# Patient Record
(demographics unavailable — no encounter records)

---

## 2025-04-22 NOTE — HISTORY OF PRESENT ILLNESS
[FreeTextEntry1] : Patient presents today for reevaluation of low back pain.  Patient reports was initially evaluated in June 2024 for complaints of low back pain with with intermittent radicular symptoms involving right lower extremity.  Patient reports she completed a course of physical therapy at that time as well as taking Mobic and Skelaxin unfortunately no significant improvement was noted.  Patient reports approximately the past 2 months she has been experiencing increasing low back pain with radicular symptoms involving right lower extremity.  Patient reports that she is currently not working due to her low back pain (patient is a ) presents today for reevaluation

## 2025-04-22 NOTE — PHYSICAL EXAM
[FreeTextEntry1] : EXAMINATION OF LUMBAR SPINE & LOWER EXTREMITIES:  Reflexes (R) L/E:                   Quadriceps 2                    Achilles 2 Reflexes (L) L/E:                    Quadriceps 2                    Achilles 2   Sensory L/E: Decreased right L4-L5     Good peripheral Pulses Bilateral L/E     Testing: Patricks (R) [- ]               Patricks (L) [- ]               Babinski [ down going bilaterally]               Chaddock [- bilaterally]               [place] [ -bilaterally]               Gonda [- bilaterally]               Clonus -[ ankle bilaterally]               Leseagues (R) [ -]               Leseagues (L) [- ]               Kemps [- ] SI Jt Lig.Challenege Test (R) +  SI Jt Lig.Challenege Test (L) -  S.L.R. ( R ) +40 degrees  S.L.R. ( L ) +50 degrees  Range of motion testing was performed with the use of a goniometer.                           Flexion: 50 degrees (normal - 75-90)                           Extension:10 degrees (normal - 30)                           Lateral Bending ( R ): 25 degrees  (normal - 35)                           Lateral Bending ( L ):35  degrees (normal - 35)                           Thoracic Rotation ( R ):30 degrees (normal - 35)                           Thoracic Rotation ( L ): 35 degrees (normal - 35) MMT: intact  Palpation of the Lumbar Spine: Tenderness and spasm involving the L4/L5 L5/S1 interspace. Tenderness involving the right upper sacroiliac joint. Tenderness and spasm the right lower lumbar paraspinal musculature. Trigger points involving the right gluteal musculature.

## 2025-04-22 NOTE — ASSESSMENT
[FreeTextEntry1] : MRI lumbar spine  Recheck after MRI for appropriate treatment plan.  Moist heat for muscle relaxation   Advil as needed for pain Home exercise plan reviewed with patient. Stressed the importance for the need for frequent change in position from sit to stand and stand to sit, as well as use of weight shifting techniques to alleviate low back discomfort. Instruction in proper posturing, body mechanics and lifting techniques.  Recheck in 3 weeks

## 2025-05-14 NOTE — ASSESSMENT
[FreeTextEntry1] : Patient reports that she has exhausted conservative management in terms of physical therapy, chiropractic care, acupuncture, TPI therapy, none of which have provided any significant relief. Patient declines neurosurgical consultation Pain management consult  evaluate for lumbar BOY/MMB lumbar facets  Moist heat for muscle relaxation   Advil as needed for pain Home exercise plan reviewed with patient. Stressed the importance for the need for frequent change in position from sit to stand and stand to sit, as well as use of weight shifting techniques to alleviate low back discomfort. Instruction in proper posturing, body mechanics and lifting techniques.  Recheck in 6 weeks

## 2025-05-14 NOTE — HISTORY OF PRESENT ILLNESS
[FreeTextEntry1] : Patient continues to complain of low back pain primarily right-sided with intermittent radicular symptoms involving her right lower extremity.  MRI of the lumbar spine was reviewed with the patient.

## 2025-05-14 NOTE — PHYSICAL EXAM
[FreeTextEntry1] : EXAMINATION OF LUMBAR SPINE & LOWER EXTREMITIES:  Reflexes (R) L/E:                   Quadriceps 2                    Achilles 2 Reflexes (L) L/E:                    Quadriceps 2                    Achilles 2   Sensory L/E: Decreased right L4-L5   SI Jt Lig.Challenege Test (R) +  SI Jt Lig.Challenege Test (L) -  S.L.R. ( R ) +40 degrees  S.L.R. ( L ) +50 degrees  Range of motion testing was performed with the use of a goniometer.                           Flexion: 55 degrees (normal - 75-90)                           Extension:15 degrees (normal - 30)                           Lateral Bending ( R ): 25 degrees  (normal - 35)                           Lateral Bending ( L ):35  degrees (normal - 35)                           Thoracic Rotation ( R ):30 degrees (normal - 35)                           Thoracic Rotation ( L ): 35 degrees (normal - 35) MMT: intact  Palpation of the Lumbar Spine: Tenderness and spasm involving the L4/L5 L5/S1 interspace. Tenderness involving the right upper sacroiliac joint. Tenderness and spasm the right lower lumbar paraspinal musculature. Trigger points involving the right gluteal musculature.

## 2025-06-09 NOTE — PHYSICAL EXAM
[de-identified] : Constitutional: - No acute distress - Well developed; well nourished   Neurological: - normal mood and affect - alert and oriented x 3   Cardiovascular: - grossly normal  Lumbar Spine Exam:   Inspection: erythema (-) ecchymosis (-) rashes (-) alignment: no scoliosis   Palpation: Midline lumbar tenderness:            (+) midline thoracic tenderness:          (-) Lumbar paraspinal tenderness:      L (-); R (+) thoracic paraspinal tenderness:     L (-); R (-) sciatic nerve tenderness :              L (-); R (-) SI joint tenderness:                        L (-); R (-) GTB tenderness:                            L (-); R (-)   ROM:  reduced all planes pain with extension>flexion  Strength:                                    Right       Left  Hip Flexion:                (5/5)       (5/5) Quadriceps:               (5/5)       (5/5) Hamstrings:                (5/5)       (5/5) Ankle Dorsiflexion:     (5/5)       (5/5) EHL:                           (5/5)       (5/5) Ankle Plantarflexion:  (5/5)       (5/5)   Special Tests: SLR:                           R (=) ; L (=) Facet loading:            R (+) ; L (+) SHERRY test:               R (n/a) ; L (n/a) Hamstring tightness:  R (+);  L (+)   Neurologic: SI LT throughout right lower extremity SI LT throughout left lower extremity   Reflexes normal and symmetric bilateral lower extremities   Gait: non- antalgic gait ambulates without assistive device

## 2025-06-09 NOTE — PHYSICAL EXAM
52.1 [de-identified] : Constitutional: - No acute distress - Well developed; well nourished   Neurological: - normal mood and affect - alert and oriented x 3   Cardiovascular: - grossly normal  Lumbar Spine Exam:   Inspection: erythema (-) ecchymosis (-) rashes (-) alignment: no scoliosis   Palpation: Midline lumbar tenderness:            (+) midline thoracic tenderness:          (-) Lumbar paraspinal tenderness:      L (-); R (+) thoracic paraspinal tenderness:     L (-); R (-) sciatic nerve tenderness :              L (-); R (-) SI joint tenderness:                        L (-); R (-) GTB tenderness:                            L (-); R (-)   ROM:  reduced all planes pain with extension>flexion  Strength:                                    Right       Left  Hip Flexion:                (5/5)       (5/5) Quadriceps:               (5/5)       (5/5) Hamstrings:                (5/5)       (5/5) Ankle Dorsiflexion:     (5/5)       (5/5) EHL:                           (5/5)       (5/5) Ankle Plantarflexion:  (5/5)       (5/5)   Special Tests: SLR:                           R (=) ; L (=) Facet loading:            R (+) ; L (+) SHERRY test:               R (n/a) ; L (n/a) Hamstring tightness:  R (+);  L (+)   Neurologic: SI LT throughout right lower extremity SI LT throughout left lower extremity   Reflexes normal and symmetric bilateral lower extremities   Gait: non- antalgic gait ambulates without assistive device

## 2025-06-09 NOTE — HISTORY OF PRESENT ILLNESS
[Lower back] : lower back [10] : 10 [Dull/Aching] : dull/aching [Burning] : burning [Radiating] : radiating [Sharp] : sharp [Shooting] : shooting [Stabbing] : stabbing [Constant] : constant [Household chores] : household chores [Leisure] : leisure [Work] : work [Sleep] : sleep [Nothing helps with pain getting better] : Nothing helps with pain getting better [Walking] : walking [Full time] : Work status: full time [FreeTextEntry1] : 2025 - The patient presents for initial evaluation regarding her low back pain.  Patient was referred by Dr. Ba. Patient reports she has had chronic, longstanding low back pain (R>L) for several years. Over the last few months, she has had a flare up and exacerbation of her symptoms with no specific inciting event. She has done PT, TPI, and acupuncture therapy in the past without meaningful benefit. She reports her pain is worst in the mornings and with increased physical activity. She tried Meloxicam in the past without benefit; she is not using any medication therapies at this time. She reports she works in [employer] and has been working light duty per Dr. Ba for the last several weeks.   Subjective weakness: No Lower extremity paresthesias: No Bladder/bowel dysfunction: No   Injections: No   Pertinent Surgical History: N/A   Imagin) MRI Lumbar Spine (2025) - [facility] There is grade 1 anterolisthesis of L4 on L5, unchanged compared to the prior study. There is mild bone marrow edema involving the L4-L5 bilateral facet joints and L4 and L5 bilateral pedicles probably due to facet arthropathy and altered mechanics/stress fracture. Lumbar vertebral body heights are maintained. There is no compression fracture. There are scattered focal fat versus hemangiomas. There is disc dehydration at L4-L5 and L5-S1 levels. Conus is normal. Paraspinal soft tissues are unremarkable. There is mild edema in the subcutaneous fat tissues. L1-2: There is no disc bulge, herniation, thecal sac compression or foraminal narrowing. L2-3: There is no disc bulge, herniation, thecal sac compression or foraminal narrowing. L3-4: There is no disc bulge, herniation, thecal sac compression or foraminal narrowing. L4-5: There is anterolisthesis with uncovered disc material and superimposed disc bulge and left foraminal disc herniation impinging the exiting left L4 nerve root. There is severe bilateral facet arthropathy. There is moderate left-greater-than-right foraminal narrowing. There is mild spinal canal stenosis. L5-S1: There is broad-based central protruded disc herniation with annular fissure impressing upon the ventral thecal sac and indenting descending S1 nerve roots. There is bilateral facet arthropathy. There is mild bilateral foraminal narrowing. Central spinal canal is patent.  Physician Disclaimer: I have personally reviewed and confirmed all HPI data with the patient. [] : Post Surgical Visit: no [de-identified] : any twisting  [de-identified] :  MRI Lumbar Spine (5/8/2025) -  [facility]

## 2025-06-09 NOTE — HISTORY OF PRESENT ILLNESS
[Lower back] : lower back [10] : 10 [Burning] : burning [Dull/Aching] : dull/aching [Sharp] : sharp [Radiating] : radiating [Shooting] : shooting [Stabbing] : stabbing [Constant] : constant [Household chores] : household chores [Leisure] : leisure [Work] : work [Sleep] : sleep [Nothing helps with pain getting better] : Nothing helps with pain getting better [Walking] : walking [Full time] : Work status: full time [FreeTextEntry1] : 2025 - The patient presents for initial evaluation regarding her low back pain.  Patient was referred by Dr. Ba. Patient reports she has had chronic, longstanding low back pain (R>L) for several years. Over the last few months, she has had a flare up and exacerbation of her symptoms with no specific inciting event. She has done PT, TPI, and acupuncture therapy in the past without meaningful benefit. She reports her pain is worst in the mornings and with increased physical activity. She tried Meloxicam in the past without benefit; she is not using any medication therapies at this time. She reports she works in [employer] and has been working light duty per Dr. Ba for the last several weeks.   Subjective weakness: No Lower extremity paresthesias: No Bladder/bowel dysfunction: No   Injections: No   Pertinent Surgical History: N/A   Imagin) MRI Lumbar Spine (2025) - [facility] There is grade 1 anterolisthesis of L4 on L5, unchanged compared to the prior study. There is mild bone marrow edema involving the L4-L5 bilateral facet joints and L4 and L5 bilateral pedicles probably due to facet arthropathy and altered mechanics/stress fracture. Lumbar vertebral body heights are maintained. There is no compression fracture. There are scattered focal fat versus hemangiomas. There is disc dehydration at L4-L5 and L5-S1 levels. Conus is normal. Paraspinal soft tissues are unremarkable. There is mild edema in the subcutaneous fat tissues. L1-2: There is no disc bulge, herniation, thecal sac compression or foraminal narrowing. L2-3: There is no disc bulge, herniation, thecal sac compression or foraminal narrowing. L3-4: There is no disc bulge, herniation, thecal sac compression or foraminal narrowing. L4-5: There is anterolisthesis with uncovered disc material and superimposed disc bulge and left foraminal disc herniation impinging the exiting left L4 nerve root. There is severe bilateral facet arthropathy. There is moderate left-greater-than-right foraminal narrowing. There is mild spinal canal stenosis. L5-S1: There is broad-based central protruded disc herniation with annular fissure impressing upon the ventral thecal sac and indenting descending S1 nerve roots. There is bilateral facet arthropathy. There is mild bilateral foraminal narrowing. Central spinal canal is patent.  Physician Disclaimer: I have personally reviewed and confirmed all HPI data with the patient. [de-identified] : any twisting  [] : Post Surgical Visit: no [de-identified] :  MRI Lumbar Spine (5/8/2025) -  [facility]

## 2025-06-09 NOTE — ASSESSMENT
[FreeTextEntry1] : We discussed the nature of the underlying pathology and available pain management treatment options. These included interventional, rehabilitative, pharmacological, and complementary modalities. We will proceed with the following:    Interventional treatment options: - Proceed with bilateral L4-L5, L5-S1 facet joint MBB with fluoroscopic guidance; proceed to RFA if adequate relief - Patient may be candidate for BVN ablation for refractory vertebrogenic pain component; not discussed - see additional instructions below    Rehabilitative options: - Continue physical therapy as per physiatry - Participation in active HEP was discussed and encouraged as tolerated   Medication based treatment options: - Initiate trial of Naprosyn 500 mg up to BID as needed - See additional instructions below    Complementary treatment options: - lifestyle modifications discussed   Additional treatment recommendations as follows: - Follow up 1-2 weeks post injection for assessment of efficacy and further treatment recommendation  We have discussed the risks, benefits, and alternatives for NSAID therapy including but not limited to the risk of bleeding, thrombosis, gastric mucosal irritation/ulceration, allergic reaction and kidney dysfunction.  The patient was counseled to utilize NSAIDs concurrently with food and/or a PPI if applicable.  They were counseled to use the lowest effective dose for the shortest duration. The patient verbalizes an understanding.  The risks, benefits and alternatives of the proposed procedure were explained in detail with the patient. The risks outlined include but are not limited to infection, bleeding, post- dural puncture headache, nerve injury, a temporary increase in pain, failure to resolve symptoms, need for future interventions, allergic reaction, and possible elevation of blood sugar in diabetics if using corticosteroid.  All questions were answered to patient's apparent satisfaction, and he/she verbalized an understanding.  Patient presents with axial lumbar pain that has not responded to 3 months of conservative therapy including physical therapy or NSAID therapy.  The pain is interfering with activities of daily living and functionality.  There is no radicular pain.  The pain is exacerbated by facet loading.  Positive Kemps maneuver which is defined by pain reproduction with extension and rotation of the lumbar spine to the affected side.  The patient has not had a vertebral fusion at the levels of the proposed treatment.  There is no unexplained neurologic deficit.  There is no history of systemic infection, unstable medical condition, bleeding tendency, or local infection.  The injection is being performed to diagnose the facet joint as the source of the individual's pain, in preparation for a radiofrequency ablation.  Frances TRINH, acting as scribe, attest that this documentation has been prepared under the direction and in the presence of Provider Asim Stone DO.  The documentation recorded by the scribe, in my presence, accurately reflects the service I personally performed, and the decisions made by me with my edits as appropriate.

## 2025-06-25 NOTE — PROCEDURE
[FreeTextEntry3] : Date of Service: 06/25/2025   Account: [id number]  Patient: GRACIE DE LA CRUZ   YOB: 1983  Age: 41 year  Surgeon:                  Asim Stone DO  Assistant:                None  Pre-Operative Diagnosis:   Lumbar Spondylosis      Post Operative Diagnosis:  Lumbar Spondylosis  Procedure:             Bilateral L4-5, L5-S1 facet block under fluoroscopic guidance.  Anesthesia:            MAC  This procedure was carried out using fluoroscopic guidance.  The risks and benefits of the procedure were discussed extensively with the patient.  The consent of the patient was obtained and the following procedure was performed.  A timeout was performed with all essential staff present and the site and side were verified.  The lumbar vertebral bodies were identified and the fluoroscope was obliqued ipsilateral to approximately 30 degrees to reveal the appropriate anatomical view.  The junction of the superior articulate process and transverse process at the right L4 and L5 levels were identified and marked.   The skin at these target points was then localized using 1 cc of 1% Lidocaine at each injection site.  A spinal needle was then introduced and advanced to the above target points at the junction of the SAP and transverse processes until bone was contacted.  Fluoroscope then focused on the right sacral ala on A/P view and marked at this point.  The skin and subcutaneous structures were localized using 1cc of 1.0 % lidocaine.  A spinal needle was then advanced under fluoroscopic guidance until bone was contacted at the ala.    After negative aspiration for heme and CSF 1 cc of 0.5% Marcaine was injected at each of the injection sites.  The procedure was performed in the exact same fashion on the contralateral left side at the L4, L5 and sacral ala levels.  Vital signs remained normal throughout the procedure.  The patient tolerated the procedure well.  There were no immediate complications from the performed procedure.  The patient was instructed to apply ice over the injection sites for twenty minutes every two hours for the next 24 hours.  Disposition:      1. The patient was advised to F/U in 1-2 weeks to assess the response to the injection.       2. They were advised to keep a pain diary to report the results of the diagnostic block at their FUV.      3. The patient was also instructed to contact me immediately if there were any concerns related to the procedure performed.

## 2025-07-15 NOTE — ASSESSMENT
[FreeTextEntry1] : We discussed the nature of the underlying pathology and available pain management treatment options. These included interventional, rehabilitative, pharmacological, and complementary modalities. We will proceed with the following:    Interventional treatment options: - Proceed with bilateral L5-S1 TFESI with fluoroscopic guidance - Patient did not report adequate relief with bilateral L4-L5, L5-S1 facet joint MBB; would not proceed with repeat - Patient may be candidate for BVN ablation for refractory vertebrogenic pain component; briefly discussed - see additional instructions below    Rehabilitative options: - Continue physical therapy as per physiatry - Participation in active HEP was discussed and encouraged as tolerated   Medication based treatment options: - Continue Naprosyn 500 mg up to BID as needed - See additional instructions below    Complementary treatment options: - lifestyle modifications discussed   Additional treatment recommendations as follows: - Follow up 1-2 weeks post injection for assessment of efficacy and further treatment recommendation  We have discussed the risks, benefits, and alternatives for NSAID therapy including but not limited to the risk of bleeding, thrombosis, gastric mucosal irritation/ulceration, allergic reaction and kidney dysfunction.  The patient was counseled to utilize NSAIDs concurrently with food and/or a PPI if applicable.  They were counseled to use the lowest effective dose for the shortest duration. The patient verbalizes an understanding.  The risks, benefits and alternatives of the proposed procedure were explained in detail with the patient. The risks outlined include but are not limited to infection, bleeding, post- dural puncture headache, nerve injury, a temporary increase in pain, failure to resolve symptoms, need for future interventions, allergic reaction, and possible elevation of blood sugar in diabetics if using corticosteroid.  All questions were answered to patient's apparent satisfaction, and he/she verbalized an understanding.  I, Idalia Bruno NP, acting as scribe, attest that this documentation has been prepared under the direction and in the presence of Provider Asim Stone DO.    The documentation recorded by the scribe, in my presence, accurately reflects the service I personally performed, and the decisions made by me with my edits as appropriate.

## 2025-07-15 NOTE — REVIEW OF SYSTEMS
[Constipation] : constipation [Headache] : headache [Dizziness] : dizziness [Feeling Weak] : feeling weak [Negative] : Heme/Lymph [de-identified] : Back

## 2025-07-15 NOTE — PHYSICAL EXAM
[de-identified] : Constitutional: - No acute distress - Well developed; well nourished   Neurological: - normal mood and affect - alert and oriented x 3   Cardiovascular: - grossly normal  Lumbar Spine Exam:   Inspection: erythema (-) ecchymosis (-) rashes (-) alignment: no scoliosis   Palpation: Midline lumbar tenderness:            (+) midline thoracic tenderness:          (-) Lumbar paraspinal tenderness:      L (-); R (+) thoracic paraspinal tenderness:     L (-); R (-) sciatic nerve tenderness :              L (-); R (-) SI joint tenderness:                        L (-); R (-) GTB tenderness:                            L (-); R (-)   ROM:  reduced all planes pain with extension>flexion  Strength:                                    Right       Left  Hip Flexion:                (5/5)       (5/5) Quadriceps:               (5/5)       (5/5) Hamstrings:                (5/5)       (5/5) Ankle Dorsiflexion:     (5/5)       (5/5) EHL:                           (5/5)       (5/5) Ankle Plantarflexion:  (5/5)       (5/5)   Special Tests: SLR:                           R (=) ; L (=) Facet loading:            R (+) ; L (+) SHERRY test:               R (n/a) ; L (n/a) Hamstring tightness:  R (+);  L (+)   Neurologic: SI LT throughout right lower extremity SI LT throughout left lower extremity   Reflexes normal and symmetric bilateral lower extremities   Gait: non- antalgic gait ambulates without assistive device

## 2025-07-15 NOTE — REASON FOR VISIT
[Initial Consultation] : an initial pain management consultation [Follow-Up Visit] : a follow-up pain management visit [FreeTextEntry2] : low back pain

## 2025-07-15 NOTE — HISTORY OF PRESENT ILLNESS
[Lower back] : lower back [10] : 10 [8] : 8 [Dull/Aching] : dull/aching [Sharp] : sharp [Shooting] : shooting [Stabbing] : stabbing [Constant] : constant [Household chores] : household chores [Leisure] : leisure [Sleep] : sleep [Nothing helps with pain getting better] : Nothing helps with pain getting better [Standing] : standing [Walking] : walking [FreeTextEntry1] : 7/15/2025 - Patient presents for follow-up visit after bilateral L4-L5, L5-S1 facet joint MBB on 2025.  She reports ~30% reduction in sharp low back pain following the procedure. Patient does not believe she had a positive response to the procedure and does not wish to proceed a repeat nerve block at this time.  Pain remains located across the low back without radiation.  Pain is present in all positions; sitting, standing, walking, laying; with no relief of symptoms when seated.  Pain is described as a sharp, constant and aching pain. Currently taking Naproxen 500 mg daily without meaningful benefit. Denies any alarm signs or changes in medical history since last office visit.    2025 - The patient presents for initial evaluation regarding her low back pain.  Patient was referred by Dr. Ba. Patient reports she has had chronic, longstanding low back pain (R>L) for several years. Over the last few months, she has had a flare up and exacerbation of her symptoms with no specific inciting event. She has done PT, TPI, and acupuncture therapy in the past without meaningful benefit. She reports her pain is worst in the mornings and with increased physical activity. She tried Meloxicam in the past without benefit; she is not using any medication therapies at this time. She reports she works in "[employer] and has been working light duty per Dr. Ba for the last several weeks.  Injections: 1) bilateral L4-L5, L5-S1 facet joint MBB - (25)   Pertinent Surgical History: N/A   Imagin) MRI Lumbar Spine (2025) - [facility]  There is grade 1 anterolisthesis of L4 on L5, unchanged compared to the prior study. There is mild bone marrow edema involving the L4-L5 bilateral facet joints and L4 and L5 bilateral pedicles probably due to facet arthropathy and altered mechanics/stress fracture. Lumbar vertebral body heights are maintained. There is no compression fracture. There are scattered focal fat versus hemangiomas. There is disc dehydration at L4-L5 and L5-S1 levels. Conus is normal. Paraspinal soft tissues are unremarkable. There is mild edema in the subcutaneous fat tissues. L1-2: There is no disc bulge, herniation, thecal sac compression or foraminal narrowing. L2-3: There is no disc bulge, herniation, thecal sac compression or foraminal narrowing. L3-4: There is no disc bulge, herniation, thecal sac compression or foraminal narrowing. L4-5: There is anterolisthesis with uncovered disc material and superimposed disc bulge and left foraminal disc herniation impinging the exiting left L4 nerve root. There is severe bilateral facet arthropathy. There is moderate left-greater-than-right foraminal narrowing. There is mild spinal canal stenosis. L5-S1: There is broad-based central protruded disc herniation with annular fissure impressing upon the ventral thecal sac and indenting descending S1 nerve roots. There is bilateral facet arthropathy. There is mild bilateral foraminal narrowing. Central spinal canal is patent.  Physician Disclaimer: I have personally reviewed and confirmed all HPI data with the patient. [] : Post Surgical Visit: no [de-identified] : MRI Lumbar Spine (5/8/2025) -  [facility]